# Patient Record
Sex: MALE | Race: WHITE | Employment: FULL TIME | URBAN - METROPOLITAN AREA
[De-identification: names, ages, dates, MRNs, and addresses within clinical notes are randomized per-mention and may not be internally consistent; named-entity substitution may affect disease eponyms.]

---

## 2021-01-12 ENCOUNTER — HOSPITAL ENCOUNTER (EMERGENCY)
Facility: CLINIC | Age: 36
Discharge: HOME | End: 2021-01-12
Attending: EMERGENCY MEDICINE | Admitting: EMERGENCY MEDICINE

## 2021-01-12 VITALS
RESPIRATION RATE: 18 BRPM | HEIGHT: 67 IN | OXYGEN SATURATION: 98 % | HEART RATE: 79 BPM | TEMPERATURE: 97.6 F | DIASTOLIC BLOOD PRESSURE: 60 MMHG | SYSTOLIC BLOOD PRESSURE: 135 MMHG

## 2021-01-12 DIAGNOSIS — R42 DIZZINESS: ICD-10-CM

## 2021-01-12 LAB
ALBUMIN SERPL-MCNC: 4.3 G/DL (ref 3.4–5)
ALP SERPL-CCNC: 97 U/L (ref 40–150)
ALT SERPL W P-5'-P-CCNC: 59 U/L (ref 0–70)
ANION GAP SERPL CALCULATED.3IONS-SCNC: 6 MMOL/L (ref 3–14)
AST SERPL W P-5'-P-CCNC: 34 U/L (ref 0–45)
BASOPHILS # BLD AUTO: 0 10E9/L (ref 0–0.2)
BASOPHILS NFR BLD AUTO: 0.4 %
BILIRUB SERPL-MCNC: 1.4 MG/DL (ref 0.2–1.3)
BUN SERPL-MCNC: 12 MG/DL (ref 7–30)
CALCIUM SERPL-MCNC: 9.3 MG/DL (ref 8.5–10.1)
CHLORIDE SERPL-SCNC: 103 MMOL/L (ref 94–109)
CO2 SERPL-SCNC: 28 MMOL/L (ref 20–32)
CREAT SERPL-MCNC: 0.91 MG/DL (ref 0.66–1.25)
DIFFERENTIAL METHOD BLD: NORMAL
EOSINOPHIL # BLD AUTO: 0.2 10E9/L (ref 0–0.7)
EOSINOPHIL NFR BLD AUTO: 2.8 %
ERYTHROCYTE [DISTWIDTH] IN BLOOD BY AUTOMATED COUNT: 13.6 % (ref 10–15)
GFR SERPL CREATININE-BSD FRML MDRD: >90 ML/MIN/{1.73_M2}
GLUCOSE SERPL-MCNC: 91 MG/DL (ref 70–99)
HCT VFR BLD AUTO: 47.6 % (ref 40–53)
HGB BLD-MCNC: 15.6 G/DL (ref 13.3–17.7)
IMM GRANULOCYTES # BLD: 0 10E9/L (ref 0–0.4)
IMM GRANULOCYTES NFR BLD: 0.2 %
INTERPRETATION ECG - MUSE: NORMAL
LYMPHOCYTES # BLD AUTO: 2.6 10E9/L (ref 0.8–5.3)
LYMPHOCYTES NFR BLD AUTO: 31.7 %
MCH RBC QN AUTO: 27.4 PG (ref 26.5–33)
MCHC RBC AUTO-ENTMCNC: 32.8 G/DL (ref 31.5–36.5)
MCV RBC AUTO: 84 FL (ref 78–100)
MONOCYTES # BLD AUTO: 0.4 10E9/L (ref 0–1.3)
MONOCYTES NFR BLD AUTO: 5 %
NEUTROPHILS # BLD AUTO: 4.9 10E9/L (ref 1.6–8.3)
NEUTROPHILS NFR BLD AUTO: 59.9 %
NRBC # BLD AUTO: 0 10*3/UL
NRBC BLD AUTO-RTO: 0 /100
PLATELET # BLD AUTO: 300 10E9/L (ref 150–450)
POTASSIUM SERPL-SCNC: 3.6 MMOL/L (ref 3.4–5.3)
PROT SERPL-MCNC: 8.7 G/DL (ref 6.8–8.8)
RBC # BLD AUTO: 5.69 10E12/L (ref 4.4–5.9)
SODIUM SERPL-SCNC: 137 MMOL/L (ref 133–144)
TROPONIN I SERPL-MCNC: <0.015 UG/L (ref 0–0.04)
WBC # BLD AUTO: 8.1 10E9/L (ref 4–11)

## 2021-01-12 PROCEDURE — 99284 EMERGENCY DEPT VISIT MOD MDM: CPT | Mod: 25

## 2021-01-12 PROCEDURE — 84484 ASSAY OF TROPONIN QUANT: CPT | Performed by: EMERGENCY MEDICINE

## 2021-01-12 PROCEDURE — 85025 COMPLETE CBC W/AUTO DIFF WBC: CPT | Performed by: EMERGENCY MEDICINE

## 2021-01-12 PROCEDURE — 96360 HYDRATION IV INFUSION INIT: CPT

## 2021-01-12 PROCEDURE — 258N000003 HC RX IP 258 OP 636: Performed by: EMERGENCY MEDICINE

## 2021-01-12 PROCEDURE — 80053 COMPREHEN METABOLIC PANEL: CPT | Performed by: EMERGENCY MEDICINE

## 2021-01-12 PROCEDURE — 93005 ELECTROCARDIOGRAM TRACING: CPT

## 2021-01-12 RX ORDER — SODIUM CHLORIDE 9 MG/ML
INJECTION, SOLUTION INTRAVENOUS CONTINUOUS
Status: DISCONTINUED | OUTPATIENT
Start: 2021-01-12 | End: 2021-01-12 | Stop reason: HOSPADM

## 2021-01-12 RX ADMIN — SODIUM CHLORIDE 1000 ML: 9 INJECTION, SOLUTION INTRAVENOUS at 09:36

## 2021-01-12 ASSESSMENT — ENCOUNTER SYMPTOMS
DIZZINESS: 1
NUMBNESS: 1
SHORTNESS OF BREATH: 0

## 2021-01-12 NOTE — ED PROVIDER NOTES
"  History   Chief Complaint:  Dizziness      HPI   Eduard Gracia is a 25 year old male who presents via EMS for evaluation of dizziness. Sometime yesterday the patient was working making tables when he started to develop the sensation of dizziness as well as some tingling in both his hands and feet. He is not certain when these symptoms began precisely. Today the patient had similar dizziness and tingling, and EMS was called to bring him into the ED for evaluation. He notes that he had a similar episode last week as well. He denies feeling particularly anxious or being under new stress at work. He denies any recent chest pain or shortness of breath.     Denies vertigo, says he feels more lightheaded    Review of Systems   Respiratory: Negative for shortness of breath.    Cardiovascular: Negative for chest pain.   Neurological: Positive for dizziness and numbness (tingling, bilateral hands and feet).   All other systems reviewed and are negative.      Allergies:  NKDA     Medications:  The patient is not currently taking any prescribed medications.     Past Medical History:    The patient denies any past medical history.     Social History:  The patient presents via EMS.   The patient makes tables for a living.     Physical Exam     Patient Vitals for the past 24 hrs:   BP Temp Temp src Pulse Resp SpO2 Height   01/12/21 0926 135/60 97.6  F (36.4  C) Temporal 79 18 98 % 1.702 m (5' 7\")       Physical Exam  Vitals: reviewed by me  General: Pt seen on [facility], pleasant, cooperative, and alert to conversation  Eyes: Tracking well, clear conjunctiva BL  ENT: MMM, midline trachea.   Lungs:   No tachypnea, no accessory muscle use. No respiratory distress.   CV: Rate as above, regular rhythm.    Abd: Soft, non tender, no guarding, no rebound. Non distended  MSK: no peripheral edema or joint effusion.  No evidence of trauma  Skin: No rash, normal turgor and temperature  Neuro: Clear speech and no facial droop.  Psych: " Not RIS, no e/o AH/VH      Emergency Department Course   ECG (10:02:33):  Indication: Screening for cardiovascular disease.   Rate 71 bpm. NY interval 164 ms. QRS duration 92 ms. QT/QTc 366/397 ms. P-R-T axes 45 41 37.   Interpretation: Normal sinus rhythm, Normal ECG   Agree with computer interpretation. Yes.   Interpreted at 1005 by Dr. Amos.       Laboratory:  CBC: WNL (WBC 8.1, HGB 15.6, )   CMP: Bilirubin total 1.4 high, o/w WNL (Creatinine 0.91)   Troponin I 0937: <0.015        Emergency Department Course:  Reviewed:  I reviewed nursing notes, vitals and past medical history    Assessments:  0924: I obtained history and examined the patient as noted above.   1025: I updated and reassessed the patient.      Interventions:  0936 NS 1,000 mL IV     Disposition:  The patient was discharged to home.      Impression & Plan     Medical Decision Making:  Eduard Hartley is a very pleasant 35 year old male who presents to the emergency room with dizziness and lightheadedness that started yesterday. He also talks about tingling in his fingers and his toes, leading me to believe that he may have had some element of carpal pedal spasm and hyperventilation here. He feels much improved with reassurance, after a negative workup and IV fluids. He tells me he has no pain, no shortness of breath, and no paresthesias at this time. He is able to get up and walk around the room without any dizziness. We went over very strict return to ED precautions as well as primary care follow up in the next week, and the patient understands that no clear cause of his dizziness was found, although we were able to provide a certain degree of reassurance here today. The patient is okay with this plan.     Diagnosis:    ICD-10-CM   1. Dizziness  R42        Scribe Disclosure:  I, Yamil Paul, am serving as a scribe at 9:24 AM on 1/12/2021 to document services personally performed by Dr. Amos, based on my observations and  the provider's statements to me.           Quang Amos MD  01/12/21 0423

## 2021-01-12 NOTE — ED TRIAGE NOTES
Patient brought in by EMS from work. Patient was at work and hands and feet became numb/tingly. States was dizzy. States had a similar episode last week.

## 2021-01-12 NOTE — ED AVS SNAPSHOT
[facility] Emergency Dept  [address] [address]  Phone: [phone]  Fax: [phone]                                    Eduard Hartley   MRN: [id number]    Department: [facility] Emergency Dept   Date of Visit: 1/12/2021           After Visit Summary Signature Page    I have received my discharge instructions, and my questions have been answered. I have discussed any challenges I see with this plan with the nurse or doctor.    ..........................................................................................................................................  Patient/Patient Representative Signature      ..........................................................................................................................................  Patient Representative Print Name and Relationship to Patient    ..................................................               ................................................  Date                                   Time    ..........................................................................................................................................  Reviewed by Signature/Title    ...................................................              ..............................................  Date                                               Time          22EPIC Rev 08/18

## 2021-01-12 NOTE — LETTER
January 12, 2021      To Whom It May Concern:      Eduard Hartley was seen in our Emergency Department today, 01/12/21.  I expect his condition to improve over the next 1 day.  He may return to work/school when improved.    Sincerely,        Marian THACKER RN

## 2021-01-12 NOTE — DISCHARGE INSTRUCTIONS
As we talked about, no clear cause your dizziness was found, though we are reassured that your labs are within normal limits.  Please do see your regular provider in the next week, this is very important as again no clear cause was found today.  Come back to the ER immediately with any worsening dizziness, if you are unable to walk appropriately, or with any other concerns.

## 2021-01-12 NOTE — ED NOTES
Bed: ED14  Expected date:   Expected time:   Means of arrival:   Comments:  [facility] - dizziness now